# Patient Record
Sex: FEMALE | Race: WHITE
[De-identification: names, ages, dates, MRNs, and addresses within clinical notes are randomized per-mention and may not be internally consistent; named-entity substitution may affect disease eponyms.]

---

## 2018-07-24 ENCOUNTER — HOSPITAL ENCOUNTER (OUTPATIENT)
Dept: HOSPITAL 61 - LAB | Age: 38
Discharge: HOME | End: 2018-07-24
Attending: FAMILY MEDICINE
Payer: COMMERCIAL

## 2018-07-24 DIAGNOSIS — E03.9: ICD-10-CM

## 2018-07-24 DIAGNOSIS — E78.5: Primary | ICD-10-CM

## 2018-07-24 LAB
ALBUMIN SERPL-MCNC: 3.7 G/DL (ref 3.4–5)
ALBUMIN/GLOB SERPL: 1.2 {RATIO} (ref 1–1.7)
ALP SERPL-CCNC: 37 U/L (ref 46–116)
ALT (SGPT): 30 U/L (ref 14–59)
ANION GAP SERPL CALC-SCNC: 9 MMOL/L (ref 6–14)
AST SERPL-CCNC: 16 U/L (ref 15–37)
BLOOD UREA NITROGEN: 11 MG/DL (ref 7–20)
BUN/CREAT SERPL: 12 (ref 6–20)
CALCIUM: 8.6 MG/DL (ref 8.5–10.1)
CHLORIDE: 105 MMOL/L (ref 98–107)
CHOLESTEROL/HDL RATIO: 4.6
CHOLESTEROL: 170 MG/DL (ref 0–200)
CO2 SERPL-SCNC: 27 MMOL/L (ref 21–32)
CREAT SERPL-MCNC: 0.9 MG/DL (ref 0.6–1)
GFR SERPLBLD BASED ON 1.73 SQ M-ARVRAT: 70.8 ML/MIN
GLOBULIN SER-MCNC: 3.1 G/DL (ref 2.2–3.8)
GLUCOSE SERPL-MCNC: 79 MG/DL (ref 70–99)
HDLC: 37 MG/DL (ref 40–60)
LDLC: 112 MG/DL (ref 0–100)
NON-HDL CHOLESTEROL: 133 MG/DL (ref 0–129)
POTASSIUM SERPL-SCNC: 3.7 MMOL/L (ref 3.5–5.1)
SODIUM: 141 MMOL/L (ref 136–145)
THYROID STIM HORMONE (TSH): 2.18 UIU/ML (ref 0.36–3.74)
TOTAL BILIRUBIN: 0.3 MG/DL (ref 0.2–1)
TOTAL PROTEIN: 6.8 G/DL (ref 6.4–8.2)
TRIGLYCERIDES: 107 MG/DL (ref 0–150)
VLDLC: 21 MG/DL (ref 0–40)

## 2018-07-24 PROCEDURE — 80061 LIPID PANEL: CPT

## 2018-07-24 PROCEDURE — 84443 ASSAY THYROID STIM HORMONE: CPT

## 2018-07-24 PROCEDURE — 84436 ASSAY OF TOTAL THYROXINE: CPT

## 2018-07-24 PROCEDURE — 36415 COLL VENOUS BLD VENIPUNCTURE: CPT

## 2018-07-24 PROCEDURE — 80053 COMPREHEN METABOLIC PANEL: CPT

## 2018-07-25 LAB — THYROXINE: 7 UG/DL (ref 4.5–12)

## 2018-09-20 ENCOUNTER — HOSPITAL ENCOUNTER (EMERGENCY)
Dept: HOSPITAL 61 - ER | Age: 38
Discharge: HOME | End: 2018-09-20
Payer: COMMERCIAL

## 2018-09-20 VITALS — WEIGHT: 218 LBS | BODY MASS INDEX: 36.32 KG/M2 | HEIGHT: 65 IN

## 2018-09-20 VITALS — DIASTOLIC BLOOD PRESSURE: 94 MMHG | SYSTOLIC BLOOD PRESSURE: 153 MMHG

## 2018-09-20 DIAGNOSIS — S46.911A: Primary | ICD-10-CM
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
## 2018-09-20 PROCEDURE — 73030 X-RAY EXAM OF SHOULDER: CPT

## 2018-09-20 PROCEDURE — 99284 EMERGENCY DEPT VISIT MOD MDM: CPT

## 2018-09-20 NOTE — RAD
EXAM: Right shoulder, 3 views.

 

HISTORY: Lifting injury.

 

COMPARISON: None.

 

FINDINGS: 3 views of the right shoulder obtained. There is no fracture, 

dislocation or subluxation.

 

IMPRESSION: No acute osseous finding.

 

Electronically signed by: Peyton Crawford MD (9/20/2018 2:06 PM) [place] [id number]
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
## 2019-04-05 ENCOUNTER — HOSPITAL ENCOUNTER (EMERGENCY)
Dept: HOSPITAL 61 - ER | Age: 39
Discharge: HOME | End: 2019-04-05
Payer: SELF-PAY

## 2019-04-05 VITALS — HEIGHT: 65 IN | BODY MASS INDEX: 36.65 KG/M2 | WEIGHT: 220 LBS

## 2019-04-05 VITALS — SYSTOLIC BLOOD PRESSURE: 162 MMHG | DIASTOLIC BLOOD PRESSURE: 107 MMHG

## 2019-04-05 DIAGNOSIS — E03.9: ICD-10-CM
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
## 2019-04-05 DIAGNOSIS — Z88.7: ICD-10-CM

## 2019-04-05 DIAGNOSIS — Z98.51: ICD-10-CM
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
## 2019-04-05 DIAGNOSIS — S01.511A: Primary | ICD-10-CM

## 2019-04-05 PROCEDURE — 99283 EMERGENCY DEPT VISIT LOW MDM: CPT

## 2019-04-05 PROCEDURE — 12013 RPR F/E/E/N/L/M 2.6-5.0 CM: CPT

## 2019-04-05 PROCEDURE — 12011 RPR F/E/E/N/L/M 2.5 CM/<: CPT

## 2019-04-05 NOTE — PHYS DOC
Past Medical History


Past Medical History:  Hypothyroid


Past Surgical History:  Tubal ligation


Alcohol Use:  None


Drug Use:  None





Adult General


Chief Complaint


Chief Complaint:  LACERATION/AVULSION





HPI


HPI





Patient is a 38  year old female who presents with a lac on her upper lip and 

left hand. She was had her hands full this morning while walking into work. She 

tripped while trying to go through the front door. She hit her lip on the door 

knob and her left hand when through a window. She states the EMS cleaned her 

wound on her left hand and took out a couple pieces of glass. She does not 

think there is any glass remaining in the wound. She is most worried about the 

cut on her lip. It is mostly located on the under surface of her upper lip.  []





Review of Systems


Review of Systems





Constitutional: Denies fever or chills []


Eyes: Denies change in visual acuity, redness, or eye pain []





Current Medications


Current Medications





Current Medications








 Medications


  (Trade)  Dose


 Ordered  Sig/Mendel  Start Time


 Stop Time Status Last Admin


Dose Admin


 


 Lidocaine HCl


  (Lidocaine 1%


 20ml Vial)  20 ml  STK-MED ONCE  4/5/19 10:12


 4/5/19 10:13 DC  





 


 Lidocaine/


 Epinephrine


  (LIDOCAINE


 1%-EPI 1:100,000


 Multi-Dose)  20 ml  1X  ONCE  4/5/19 10:15


 4/5/19 10:16 DC 4/5/19 10:15


20 ML











Allergies


Allergies





Allergies








Coded Allergies Type Severity Reaction Last Updated Verified


 


  tetanus and diphtheria toxoids Allergy Intermediate Rash 9/20/18 Yes











Physical Exam


Physical Exam





Constitutional: Well developed, well nourished, no acute distress, non-toxic 

appearance. []


HENT: Normocephalic, atraumatic, bilateral external ears normal, oropharynx 

moist, no oral exudates, nose normal. []


Eyes: PERRLA, EOMI, conjunctiva normal, no discharge. [] 


Neck: Normal range of motion, no tenderness, supple, no stridor. [] 


Pulmonary: Normal respiratory effort no increased work of breathing no obvious 

chest wall trauma


Abdomen: Bowel sounds normal, soft, no tenderness, no masses, no pulsatile 

masses. [] 


Skin: 3 cm laceration to the intraoral upper lip does not cross vermilion border


Back: No tenderness, no CVA tenderness. [] 


Extremities: Abrasion with no suturable laceration to the left hand patient 

declined x-ray


Neurologic: Alert and oriented X 3, normal motor function, normal sensory 

function, no focal deficits noted. []


Psychologic: Affect normal, judgement normal, mood normal. []





Current Patient Data


Vital Signs





 Vital Signs








  Date Time  Temp Pulse Resp B/P (MAP) Pulse Ox O2 Delivery O2 Flow Rate FiO2


 


4/5/19 09:40 97.6 78 18 162/107 (125) 97 Room Air  





 97.6       











EKG


EKG


[]





Radiology/Procedures


Radiology/Procedures


[]





Course & Med Decision Making


Course & Med Decision Making


Pertinent Labs and Imaging studies reviewed. (See chart for details)





[]Procedure note verbal consent obtained patient cannot get a tetanus shot she 

has a bad allergy low risk injury anyway , lidocaine subcutaneous 3 MLS for 

anesthesia 5-0 rapidly absorbing chromic gut 3 stitches were applied patient 

tolerated well since interrupted single layer no foreign body debrided of 

devitalized tissue excellent  mucosal approximation wound care instructions 

were provided





Dragon Disclaimer


Dragon Disclaimer


This electronic medical record was generated, in whole or in part, using a 

voice recognition dictation system.





Departure


Departure


Impression:  


 Primary Impression:  


 Laceration


Disposition:  01 HOME, SELF-CARE


Condition:  STABLE


Referrals:  


SHELBY MARIANO MD (PCP)











ORVILLE WARE MD Apr 5, 2019 10:14

## 2022-03-26 ENCOUNTER — HOSPITAL ENCOUNTER (EMERGENCY)
Dept: HOSPITAL 61 - ER | Age: 42
Discharge: HOME | End: 2022-03-26
Payer: COMMERCIAL

## 2022-03-26 VITALS — DIASTOLIC BLOOD PRESSURE: 77 MMHG | SYSTOLIC BLOOD PRESSURE: 136 MMHG

## 2022-03-26 VITALS — BODY MASS INDEX: 39.45 KG/M2 | HEIGHT: 65 IN | WEIGHT: 236.78 LBS

## 2022-03-26 DIAGNOSIS — T20.16XA: Primary | ICD-10-CM
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
## 2022-03-26 DIAGNOSIS — F17.200: ICD-10-CM

## 2022-03-26 DIAGNOSIS — T20.14XA: ICD-10-CM

## 2022-03-26 DIAGNOSIS — S05.00XA: ICD-10-CM

## 2022-03-26 DIAGNOSIS — T26.02XA: ICD-10-CM
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
## 2022-03-26 DIAGNOSIS — T26.01XA: ICD-10-CM

## 2022-03-26 DIAGNOSIS — E03.9: ICD-10-CM
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
## 2022-03-26 DIAGNOSIS — Z88.7: ICD-10-CM

## 2022-03-26 DIAGNOSIS — T20.17XA: ICD-10-CM

## 2022-03-26 DIAGNOSIS — T20.12XA: ICD-10-CM
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
## 2022-03-26 LAB
ALBUMIN SERPL-MCNC: 4.1 G/DL (ref 3.4–5)
ALBUMIN/GLOB SERPL: 1.1 {RATIO} (ref 1–1.7)
ALP SERPL-CCNC: 37 U/L (ref 46–116)
ALT SERPL-CCNC: 47 U/L (ref 14–59)
ANION GAP SERPL CALC-SCNC: 12 MMOL/L (ref 6–14)
AST SERPL-CCNC: 20 U/L (ref 15–37)
BASOPHILS # BLD AUTO: 0 X10^3/UL (ref 0–0.2)
BASOPHILS NFR BLD: 0 % (ref 0–3)
BILIRUB SERPL-MCNC: 0.3 MG/DL (ref 0.2–1)
BUN SERPL-MCNC: 6 MG/DL (ref 7–20)
BUN/CREAT SERPL: 7 (ref 6–20)
CALCIUM SERPL-MCNC: 9 MG/DL (ref 8.5–10.1)
CHLORIDE SERPL-SCNC: 105 MMOL/L (ref 98–107)
CO2 SERPL-SCNC: 24 MMOL/L (ref 21–32)
CREAT SERPL-MCNC: 0.9 MG/DL (ref 0.6–1)
EOSINOPHIL NFR BLD: 0.3 X10^3/UL (ref 0–0.7)
EOSINOPHIL NFR BLD: 3 % (ref 0–3)
ERYTHROCYTE [DISTWIDTH] IN BLOOD BY AUTOMATED COUNT: 13.4 % (ref 11.5–14.5)
GFR SERPLBLD BASED ON 1.73 SQ M-ARVRAT: 69.3 ML/MIN
GLUCOSE SERPL-MCNC: 92 MG/DL (ref 70–99)
HCT VFR BLD CALC: 42.8 % (ref 36–47)
HGB BLD-MCNC: 14.8 G/DL (ref 12–15.5)
LYMPHOCYTES # BLD: 2.4 X10^3/UL (ref 1–4.8)
LYMPHOCYTES NFR BLD AUTO: 31 % (ref 24–48)
MCH RBC QN AUTO: 32 PG (ref 25–35)
MCHC RBC AUTO-ENTMCNC: 35 G/DL (ref 31–37)
MCV RBC AUTO: 92 FL (ref 79–100)
MONO #: 0.6 X10^3/UL (ref 0–1.1)
MONOCYTES NFR BLD: 8 % (ref 0–9)
NEUT #: 4.6 X10^3/UL (ref 1.8–7.7)
NEUTROPHILS NFR BLD AUTO: 58 % (ref 31–73)
PLATELET # BLD AUTO: 174 X10^3/UL (ref 140–400)
POTASSIUM SERPL-SCNC: 3.7 MMOL/L (ref 3.5–5.1)
PROT SERPL-MCNC: 7.9 G/DL (ref 6.4–8.2)
RBC # BLD AUTO: 4.67 X10^6/UL (ref 3.5–5.4)
SODIUM SERPL-SCNC: 141 MMOL/L (ref 136–145)
WBC # BLD AUTO: 7.9 X10^3/UL (ref 4–11)

## 2022-03-26 PROCEDURE — 99284 EMERGENCY DEPT VISIT MOD MDM: CPT

## 2022-03-26 PROCEDURE — 36415 COLL VENOUS BLD VENIPUNCTURE: CPT

## 2022-03-26 PROCEDURE — 80053 COMPREHEN METABOLIC PANEL: CPT

## 2022-03-26 PROCEDURE — 85025 COMPLETE CBC W/AUTO DIFF WBC: CPT

## 2022-03-26 PROCEDURE — 96361 HYDRATE IV INFUSION ADD-ON: CPT

## 2022-03-26 PROCEDURE — 99283 EMERGENCY DEPT VISIT LOW MDM: CPT

## 2022-03-26 PROCEDURE — 96374 THER/PROPH/DIAG INJ IV PUSH: CPT
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
